# Patient Record
Sex: FEMALE | Race: WHITE | URBAN - METROPOLITAN AREA
[De-identification: names, ages, dates, MRNs, and addresses within clinical notes are randomized per-mention and may not be internally consistent; named-entity substitution may affect disease eponyms.]

---

## 2017-02-21 ENCOUNTER — OUTPATIENT (OUTPATIENT)
Dept: OUTPATIENT SERVICES | Facility: HOSPITAL | Age: 72
LOS: 1 days | Discharge: HOME | End: 2017-02-21

## 2017-06-27 ENCOUNTER — OUTPATIENT (OUTPATIENT)
Dept: OUTPATIENT SERVICES | Facility: HOSPITAL | Age: 72
LOS: 1 days | Discharge: HOME | End: 2017-06-27

## 2017-06-27 DIAGNOSIS — D64.9 ANEMIA, UNSPECIFIED: ICD-10-CM

## 2017-06-27 DIAGNOSIS — R53.82 CHRONIC FATIGUE, UNSPECIFIED: ICD-10-CM

## 2017-06-27 DIAGNOSIS — E78.5 HYPERLIPIDEMIA, UNSPECIFIED: ICD-10-CM

## 2017-06-28 DIAGNOSIS — E03.9 HYPOTHYROIDISM, UNSPECIFIED: ICD-10-CM

## 2017-06-28 DIAGNOSIS — R53.82 CHRONIC FATIGUE, UNSPECIFIED: ICD-10-CM

## 2017-06-28 DIAGNOSIS — E11.9 TYPE 2 DIABETES MELLITUS WITHOUT COMPLICATIONS: ICD-10-CM

## 2017-06-28 DIAGNOSIS — D64.9 ANEMIA, UNSPECIFIED: ICD-10-CM

## 2017-06-28 DIAGNOSIS — E78.5 HYPERLIPIDEMIA, UNSPECIFIED: ICD-10-CM

## 2018-01-24 ENCOUNTER — OUTPATIENT (OUTPATIENT)
Dept: OUTPATIENT SERVICES | Facility: HOSPITAL | Age: 73
LOS: 1 days | Discharge: HOME | End: 2018-01-24

## 2018-01-24 DIAGNOSIS — D64.9 ANEMIA, UNSPECIFIED: ICD-10-CM

## 2018-01-24 DIAGNOSIS — R73.01 IMPAIRED FASTING GLUCOSE: ICD-10-CM

## 2018-01-24 DIAGNOSIS — R53.82 CHRONIC FATIGUE, UNSPECIFIED: ICD-10-CM

## 2018-01-24 DIAGNOSIS — E78.5 HYPERLIPIDEMIA, UNSPECIFIED: ICD-10-CM

## 2018-04-24 ENCOUNTER — OUTPATIENT (OUTPATIENT)
Dept: OUTPATIENT SERVICES | Facility: HOSPITAL | Age: 73
LOS: 1 days | Discharge: HOME | End: 2018-04-24

## 2018-04-24 DIAGNOSIS — E78.5 HYPERLIPIDEMIA, UNSPECIFIED: ICD-10-CM

## 2018-08-18 ENCOUNTER — TRANSCRIPTION ENCOUNTER (OUTPATIENT)
Age: 73
End: 2018-08-18

## 2018-09-12 ENCOUNTER — OUTPATIENT (OUTPATIENT)
Dept: OUTPATIENT SERVICES | Facility: HOSPITAL | Age: 73
LOS: 1 days | Discharge: HOME | End: 2018-09-12

## 2018-09-12 DIAGNOSIS — E78.5 HYPERLIPIDEMIA, UNSPECIFIED: ICD-10-CM

## 2018-10-17 ENCOUNTER — OUTPATIENT (OUTPATIENT)
Dept: OUTPATIENT SERVICES | Facility: HOSPITAL | Age: 73
LOS: 1 days | Discharge: HOME | End: 2018-10-17

## 2018-10-17 DIAGNOSIS — R76.12 NONSPECIFIC REACTION TO CELL MEDIATED IMMUNITY MEASUREMENT OF GAMMA INTERFERON ANTIGEN RESPONSE WITHOUT ACTIVE TUBERCULOSIS: ICD-10-CM

## 2019-02-01 ENCOUNTER — OUTPATIENT (OUTPATIENT)
Dept: OUTPATIENT SERVICES | Facility: HOSPITAL | Age: 74
LOS: 1 days | Discharge: HOME | End: 2019-02-01

## 2019-02-01 DIAGNOSIS — Z79.899 OTHER LONG TERM (CURRENT) DRUG THERAPY: ICD-10-CM

## 2019-02-01 DIAGNOSIS — E11.9 TYPE 2 DIABETES MELLITUS WITHOUT COMPLICATIONS: ICD-10-CM

## 2019-02-01 DIAGNOSIS — R53.82 CHRONIC FATIGUE, UNSPECIFIED: ICD-10-CM

## 2019-02-01 DIAGNOSIS — D64.9 ANEMIA, UNSPECIFIED: ICD-10-CM

## 2019-02-01 DIAGNOSIS — E78.5 HYPERLIPIDEMIA, UNSPECIFIED: ICD-10-CM

## 2019-02-01 DIAGNOSIS — D51.9 VITAMIN B12 DEFICIENCY ANEMIA, UNSPECIFIED: ICD-10-CM

## 2019-02-01 DIAGNOSIS — E03.9 HYPOTHYROIDISM, UNSPECIFIED: ICD-10-CM

## 2019-08-30 ENCOUNTER — OUTPATIENT (OUTPATIENT)
Dept: OUTPATIENT SERVICES | Facility: HOSPITAL | Age: 74
LOS: 1 days | Discharge: HOME | End: 2019-08-30

## 2019-08-30 DIAGNOSIS — E78.5 HYPERLIPIDEMIA, UNSPECIFIED: ICD-10-CM

## 2019-09-27 ENCOUNTER — OUTPATIENT (OUTPATIENT)
Dept: OUTPATIENT SERVICES | Facility: HOSPITAL | Age: 74
LOS: 1 days | Discharge: HOME | End: 2019-09-27

## 2019-09-27 DIAGNOSIS — R76.12 NONSPECIFIC REACTION TO CELL MEDIATED IMMUNITY MEASUREMENT OF GAMMA INTERFERON ANTIGEN RESPONSE WITHOUT ACTIVE TUBERCULOSIS: ICD-10-CM

## 2020-07-21 ENCOUNTER — TRANSCRIPTION ENCOUNTER (OUTPATIENT)
Age: 75
End: 2020-07-21

## 2022-04-25 ENCOUNTER — TRANSCRIPTION ENCOUNTER (OUTPATIENT)
Age: 77
End: 2022-04-25

## 2022-05-16 ENCOUNTER — OUTPATIENT (OUTPATIENT)
Dept: OUTPATIENT SERVICES | Facility: HOSPITAL | Age: 77
LOS: 1 days | Discharge: HOME | End: 2022-05-16
Payer: MEDICARE

## 2022-05-16 VITALS
WEIGHT: 130.07 LBS | HEART RATE: 80 BPM | SYSTOLIC BLOOD PRESSURE: 180 MMHG | DIASTOLIC BLOOD PRESSURE: 78 MMHG | HEIGHT: 60 IN | RESPIRATION RATE: 18 BRPM | OXYGEN SATURATION: 98 % | TEMPERATURE: 97 F

## 2022-05-16 DIAGNOSIS — Z98.890 OTHER SPECIFIED POSTPROCEDURAL STATES: Chronic | ICD-10-CM

## 2022-05-16 DIAGNOSIS — Z01.818 ENCOUNTER FOR OTHER PREPROCEDURAL EXAMINATION: ICD-10-CM

## 2022-05-16 DIAGNOSIS — N20.0 CALCULUS OF KIDNEY: ICD-10-CM

## 2022-05-16 DIAGNOSIS — N20.0 CALCULUS OF KIDNEY: Chronic | ICD-10-CM

## 2022-05-16 LAB
ALBUMIN SERPL ELPH-MCNC: 4.6 G/DL — SIGNIFICANT CHANGE UP (ref 3.5–5.2)
ALP SERPL-CCNC: 87 U/L — SIGNIFICANT CHANGE UP (ref 30–115)
ALT FLD-CCNC: 21 U/L — SIGNIFICANT CHANGE UP (ref 0–41)
ANION GAP SERPL CALC-SCNC: 13 MMOL/L — SIGNIFICANT CHANGE UP (ref 7–14)
APPEARANCE UR: CLEAR — SIGNIFICANT CHANGE UP
APTT BLD: 29.7 SEC — SIGNIFICANT CHANGE UP (ref 27–39.2)
AST SERPL-CCNC: 22 U/L — SIGNIFICANT CHANGE UP (ref 0–41)
BASOPHILS # BLD AUTO: 0.03 K/UL — SIGNIFICANT CHANGE UP (ref 0–0.2)
BASOPHILS NFR BLD AUTO: 0.4 % — SIGNIFICANT CHANGE UP (ref 0–1)
BILIRUB SERPL-MCNC: 0.3 MG/DL — SIGNIFICANT CHANGE UP (ref 0.2–1.2)
BILIRUB UR-MCNC: NEGATIVE — SIGNIFICANT CHANGE UP
BUN SERPL-MCNC: 10 MG/DL — SIGNIFICANT CHANGE UP (ref 10–20)
CALCIUM SERPL-MCNC: 9.7 MG/DL — SIGNIFICANT CHANGE UP (ref 8.5–10.1)
CHLORIDE SERPL-SCNC: 107 MMOL/L — SIGNIFICANT CHANGE UP (ref 98–110)
CO2 SERPL-SCNC: 23 MMOL/L — SIGNIFICANT CHANGE UP (ref 17–32)
COLOR SPEC: COLORLESS — SIGNIFICANT CHANGE UP
CREAT SERPL-MCNC: 0.9 MG/DL — SIGNIFICANT CHANGE UP (ref 0.7–1.5)
DIFF PNL FLD: NEGATIVE — SIGNIFICANT CHANGE UP
EGFR: 67 ML/MIN/1.73M2 — SIGNIFICANT CHANGE UP
EOSINOPHIL # BLD AUTO: 0.07 K/UL — SIGNIFICANT CHANGE UP (ref 0–0.7)
EOSINOPHIL NFR BLD AUTO: 1 % — SIGNIFICANT CHANGE UP (ref 0–8)
GLUCOSE SERPL-MCNC: 96 MG/DL — SIGNIFICANT CHANGE UP (ref 70–99)
GLUCOSE UR QL: NEGATIVE — SIGNIFICANT CHANGE UP
HCT VFR BLD CALC: 41.1 % — SIGNIFICANT CHANGE UP (ref 37–47)
HGB BLD-MCNC: 13.4 G/DL — SIGNIFICANT CHANGE UP (ref 12–16)
IMM GRANULOCYTES NFR BLD AUTO: 0.3 % — SIGNIFICANT CHANGE UP (ref 0.1–0.3)
INR BLD: 1 RATIO — SIGNIFICANT CHANGE UP (ref 0.65–1.3)
KETONES UR-MCNC: NEGATIVE — SIGNIFICANT CHANGE UP
LEUKOCYTE ESTERASE UR-ACNC: NEGATIVE — SIGNIFICANT CHANGE UP
LYMPHOCYTES # BLD AUTO: 1.54 K/UL — SIGNIFICANT CHANGE UP (ref 1.2–3.4)
LYMPHOCYTES # BLD AUTO: 22.7 % — SIGNIFICANT CHANGE UP (ref 20.5–51.1)
MCHC RBC-ENTMCNC: 29.4 PG — SIGNIFICANT CHANGE UP (ref 27–31)
MCHC RBC-ENTMCNC: 32.6 G/DL — SIGNIFICANT CHANGE UP (ref 32–37)
MCV RBC AUTO: 90.1 FL — SIGNIFICANT CHANGE UP (ref 81–99)
MONOCYTES # BLD AUTO: 0.62 K/UL — HIGH (ref 0.1–0.6)
MONOCYTES NFR BLD AUTO: 9.2 % — SIGNIFICANT CHANGE UP (ref 1.7–9.3)
NEUTROPHILS # BLD AUTO: 4.49 K/UL — SIGNIFICANT CHANGE UP (ref 1.4–6.5)
NEUTROPHILS NFR BLD AUTO: 66.4 % — SIGNIFICANT CHANGE UP (ref 42.2–75.2)
NITRITE UR-MCNC: NEGATIVE — SIGNIFICANT CHANGE UP
NRBC # BLD: 0 /100 WBCS — SIGNIFICANT CHANGE UP (ref 0–0)
PH UR: 7 — SIGNIFICANT CHANGE UP (ref 5–8)
PLATELET # BLD AUTO: 228 K/UL — SIGNIFICANT CHANGE UP (ref 130–400)
POTASSIUM SERPL-MCNC: 3.8 MMOL/L — SIGNIFICANT CHANGE UP (ref 3.5–5)
POTASSIUM SERPL-SCNC: 3.8 MMOL/L — SIGNIFICANT CHANGE UP (ref 3.5–5)
PROT SERPL-MCNC: 7.2 G/DL — SIGNIFICANT CHANGE UP (ref 6–8)
PROT UR-MCNC: NEGATIVE — SIGNIFICANT CHANGE UP
PROTHROM AB SERPL-ACNC: 11.5 SEC — SIGNIFICANT CHANGE UP (ref 9.95–12.87)
RBC # BLD: 4.56 M/UL — SIGNIFICANT CHANGE UP (ref 4.2–5.4)
RBC # FLD: 13 % — SIGNIFICANT CHANGE UP (ref 11.5–14.5)
SODIUM SERPL-SCNC: 143 MMOL/L — SIGNIFICANT CHANGE UP (ref 135–146)
SP GR SPEC: 1 — LOW (ref 1.01–1.03)
UROBILINOGEN FLD QL: SIGNIFICANT CHANGE UP
WBC # BLD: 6.77 K/UL — SIGNIFICANT CHANGE UP (ref 4.8–10.8)
WBC # FLD AUTO: 6.77 K/UL — SIGNIFICANT CHANGE UP (ref 4.8–10.8)

## 2022-05-16 PROCEDURE — 93010 ELECTROCARDIOGRAM REPORT: CPT

## 2022-05-16 PROCEDURE — 71046 X-RAY EXAM CHEST 2 VIEWS: CPT | Mod: 26

## 2022-05-16 NOTE — H&P PST ADULT - ATTENDING COMMENTS
Attestation to H&P May 23, 2022  Denies any changes.  Has not been on any type of anticoagulation, vitamins, supplements, NSAIDs.  Has no questions about the procedure today and understands all potential outcomes as well as necessary adjuvant staged elective or urgent procedures depending on success of ESWL.  We reviewed all postoperative expectations, discharge and follow-up instructions, medications, side effects and restrictions and all questions were answered.    David Flores MD Attestation to H&P May 25, 2022 1:38pm  Denies any changes.  Has not been on any type of anticoagulation, vitamins, supplements, NSAIDs.  Has no questions about the procedure today and understands all potential outcomes as well as necessary adjuvant staged elective or urgent procedures depending on success of ESWL.  We reviewed all postoperative expectations, discharge and follow-up instructions, medications, side effects and restrictions and all questions were answered.    David Flores MD

## 2022-05-16 NOTE — H&P PST ADULT - REASON FOR ADMISSION
Patient is a  77 year old  female presenting to PAST in preparation for   RIGHT EXTRACORPOREAL SHOCK WAVE LITHOTRIPSY on   5/23/22 under lsb anesthesia by Dr. purvis

## 2022-05-16 NOTE — H&P PST ADULT - HISTORY OF PRESENT ILLNESS
pt with renal calculi x 3 yrs  no pain till 3 weeks ago- passing stones  stuck in urethra  now for planned procedure     PATIENT CURRENTLY DENIES CHEST PAIN  SHORTNESS OF BREATH  PALPITATIONS,  DYSURIA, OR UPPER RESPIRATORY INFECTION IN PAST 2 WEEKS  EXERCISE  TOLERANCE  1-2 FLIGHT OF STAIRS  WITHOUT SHORTNESS OF BREATH  denies travel outside the [place] in the past 30 days  Patient denies any signs or symptoms of COVID 19 and denies contact with known positive individuals.  They have an appointment for repeat COVID testing pre-procedure and acknowledge its time and place.  They were instructed to quarantine pre-procedure, practice exposure control measures, continue to self-monitor and report any concerns to their proceduralist.  pt advised self quarantine till day of procedure  Anesthesia Alert  NO--Difficult Airway  NO--History of neck surgery or radiation  NO--Limited ROM of neck  NO--History of Malignant hyperthermia  NO--No personal or family history of Pseudocholinesterase deficiency.  NO--Prior Anesthesia Complication  NO--Latex Allergy  NO--Loose teeth  NO--History of Rheumatoid Arthritis  NO--Bleeding risk  NO--CIERRA  MTHFR mutation     PT DENIES ANY RASHES, ABRASION, OR OPEN WOUNDS OR CUTS    AS PER THE PT, THIS IS HIS/HER COMPLETE MEDICAL AND SURGICAL HX, INCLUDING MEDICATIONS PRESCRIBED AND OVER THE COUNTER    Patient verbalized understanding of instructions and was given the opportunity to ask questions and have them answered.    pt denies any suicidal ideation or thoughts, pt states not a threat to self or others    N20.0/82333    Calculus of kidney    Encounter for other preprocedural examination    ^N20.0/76451    Calculus of kidney    Encounter for other preprocedural examination

## 2022-05-16 NOTE — H&P PST ADULT - NSICDXPASTMEDICALHX_GEN_ALL_CORE_FT
PAST MEDICAL HISTORY:  HTN (hypertension)     Lung bullae     MTHFR mutation     Renal calculi     TIA (transient ischemic attack) 2004

## 2022-05-17 LAB
CULTURE RESULTS: NO GROWTH — SIGNIFICANT CHANGE UP
SPECIMEN SOURCE: SIGNIFICANT CHANGE UP

## 2022-05-18 ENCOUNTER — TRANSCRIPTION ENCOUNTER (OUTPATIENT)
Age: 77
End: 2022-05-18

## 2022-05-25 ENCOUNTER — OUTPATIENT (OUTPATIENT)
Dept: OUTPATIENT SERVICES | Facility: HOSPITAL | Age: 77
LOS: 1 days | Discharge: HOME | End: 2022-05-25
Payer: MEDICARE

## 2022-05-25 ENCOUNTER — TRANSCRIPTION ENCOUNTER (OUTPATIENT)
Age: 77
End: 2022-05-25

## 2022-05-25 VITALS
HEIGHT: 60 IN | OXYGEN SATURATION: 100 % | DIASTOLIC BLOOD PRESSURE: 83 MMHG | SYSTOLIC BLOOD PRESSURE: 192 MMHG | TEMPERATURE: 97 F | WEIGHT: 130.07 LBS | HEART RATE: 83 BPM | RESPIRATION RATE: 17 BRPM

## 2022-05-25 VITALS — DIASTOLIC BLOOD PRESSURE: 70 MMHG | HEART RATE: 69 BPM | SYSTOLIC BLOOD PRESSURE: 152 MMHG | RESPIRATION RATE: 17 BRPM

## 2022-05-25 DIAGNOSIS — N20.0 CALCULUS OF KIDNEY: Chronic | ICD-10-CM

## 2022-05-25 DIAGNOSIS — Z98.890 OTHER SPECIFIED POSTPROCEDURAL STATES: Chronic | ICD-10-CM

## 2022-05-25 PROBLEM — G45.9 TRANSIENT CEREBRAL ISCHEMIC ATTACK, UNSPECIFIED: Chronic | Status: ACTIVE | Noted: 2022-05-16

## 2022-05-25 PROBLEM — Z15.89 GENETIC SUSCEPTIBILITY TO OTHER DISEASE: Chronic | Status: ACTIVE | Noted: 2022-05-16

## 2022-05-25 PROBLEM — J43.9 EMPHYSEMA, UNSPECIFIED: Chronic | Status: ACTIVE | Noted: 2022-05-16

## 2022-05-25 PROBLEM — I10 ESSENTIAL (PRIMARY) HYPERTENSION: Chronic | Status: ACTIVE | Noted: 2022-05-16

## 2022-05-25 PROCEDURE — 74018 RADEX ABDOMEN 1 VIEW: CPT | Mod: 26

## 2022-05-25 RX ORDER — ONDANSETRON 8 MG/1
4 TABLET, FILM COATED ORAL ONCE
Refills: 0 | Status: DISCONTINUED | OUTPATIENT
Start: 2022-05-25 | End: 2022-05-26

## 2022-05-25 RX ORDER — OXYCODONE HYDROCHLORIDE 5 MG/1
5 TABLET ORAL ONCE
Refills: 0 | Status: DISCONTINUED | OUTPATIENT
Start: 2022-05-25 | End: 2022-05-26

## 2022-05-25 RX ORDER — LOSARTAN POTASSIUM 100 MG/1
1 TABLET, FILM COATED ORAL
Qty: 0 | Refills: 0 | DISCHARGE

## 2022-05-25 RX ORDER — SODIUM CHLORIDE 9 MG/ML
1000 INJECTION, SOLUTION INTRAVENOUS
Refills: 0 | Status: DISCONTINUED | OUTPATIENT
Start: 2022-05-25 | End: 2022-05-26

## 2022-05-25 RX ORDER — ALFUZOSIN HYDROCHLORIDE 10 MG/1
1 TABLET, EXTENDED RELEASE ORAL
Qty: 0 | Refills: 0 | DISCHARGE

## 2022-05-25 RX ORDER — HYDROMORPHONE HYDROCHLORIDE 2 MG/ML
1 INJECTION INTRAMUSCULAR; INTRAVENOUS; SUBCUTANEOUS ONCE
Refills: 0 | Status: DISCONTINUED | OUTPATIENT
Start: 2022-05-25 | End: 2022-05-26

## 2022-05-25 RX ORDER — HYDROMORPHONE HYDROCHLORIDE 2 MG/ML
0.5 INJECTION INTRAMUSCULAR; INTRAVENOUS; SUBCUTANEOUS ONCE
Refills: 0 | Status: DISCONTINUED | OUTPATIENT
Start: 2022-05-25 | End: 2022-05-26

## 2022-05-25 RX ADMIN — SODIUM CHLORIDE 150 MILLILITER(S): 9 INJECTION, SOLUTION INTRAVENOUS at 15:08

## 2022-05-25 NOTE — ASU DISCHARGE PLAN (ADULT/PEDIATRIC) - NS MD DC FALL RISK RISK
For information on Fall & Injury Prevention, visit: [email or web address] OR  [email or web address] OR  https://www.cdc.gov/steadi/patient.html

## 2022-05-25 NOTE — ASU PREOP CHECKLIST - WEIGHT IN LBS
130 Chart reviewed. Patient seen and examined with Dr. Dotson. Patient is known to me. I agree with Dr. Dotson's history, MSE, A/P with below additions. Patient adamantly denies SI, remains future oriented and discusses sobriety. No current AH/VH. Affect is rather euthymic, perhaps a touch anxious. Thought process is linear. She has bilateral tremor.  May have made comments in setting of intoxication- however adamantly denies SI now.  Plan per above. Medical team will treat alcohol withdrawal. She does not warrant a 1:1 for SI as she is not suicidal. Call with questions.

## 2022-05-25 NOTE — ASU PATIENT PROFILE, ADULT - FALL HARM RISK - HARM RISK INTERVENTIONS

## 2022-05-25 NOTE — CHART NOTE - NSCHARTNOTEFT_GEN_A_CORE
PACU ANESTHESIA ADMISSION NOTE      Procedure: Right ESWL  Post op diagnosis:  Right kidney stone    ____  Intubated  TV:______       Rate: ______      FiO2: ______    __x__  Patent Airway    __x__  Full return of protective reflexes    __x__  Full recovery from anesthesia / back to baseline status    Vitals:  T(C): 36.1 (05-25-22 @ 14:06), Max: 36.1 (05-25-22 @ 13:15)  HR: 83 (05-25-22 @ 14:06) (83 - 83)  BP: 192/83 (05-25-22 @ 14:06) (192/83 - 192/83)  RR: 17 (05-25-22 @ 14:06) (17 - 17)  SpO2: 100% (05-25-22 @ 14:06) (100% - 100%)    Mental Status:  __x__ Awake   ___x__ Alert   _____ Drowsy   _____ Sedated    Nausea/Vomiting:  ____ NO  ___x__Yes,   See Post - Op Orders          Pain Scale (0-10):  _____    Treatment: ____ None    _x___ See Post - Op/PCA Orders    Post - Operative Fluids:   ____ Oral   ___x_ See Post - Op Orders    Plan: Discharge:   _x___Home       _____Floor     _____Critical Care    _____  Other:_________________    Comments: uneventful anesthesia course no complications. VItals stable. Pt transferred to PACU

## 2022-06-01 ENCOUNTER — OUTPATIENT (OUTPATIENT)
Dept: OUTPATIENT SERVICES | Facility: HOSPITAL | Age: 77
LOS: 1 days | Discharge: HOME | End: 2022-06-01
Payer: MEDICARE

## 2022-06-01 DIAGNOSIS — R91.8 OTHER NONSPECIFIC ABNORMAL FINDING OF LUNG FIELD: ICD-10-CM

## 2022-06-01 DIAGNOSIS — Z98.890 OTHER SPECIFIED POSTPROCEDURAL STATES: Chronic | ICD-10-CM

## 2022-06-01 DIAGNOSIS — N20.0 CALCULUS OF KIDNEY: Chronic | ICD-10-CM

## 2022-06-01 PROCEDURE — 71250 CT THORAX DX C-: CPT | Mod: 26,MH

## 2022-06-03 DIAGNOSIS — N20.1 CALCULUS OF URETER: ICD-10-CM

## 2022-06-03 DIAGNOSIS — I10 ESSENTIAL (PRIMARY) HYPERTENSION: ICD-10-CM

## 2022-06-03 DIAGNOSIS — Z86.73 PERSONAL HISTORY OF TRANSIENT ISCHEMIC ATTACK (TIA), AND CEREBRAL INFARCTION WITHOUT RESIDUAL DEFICITS: ICD-10-CM

## 2022-06-03 DIAGNOSIS — Z88.1 ALLERGY STATUS TO OTHER ANTIBIOTIC AGENTS STATUS: ICD-10-CM

## 2022-08-30 ENCOUNTER — APPOINTMENT (OUTPATIENT)
Age: 77
End: 2022-08-30

## 2022-08-30 VITALS
OXYGEN SATURATION: 98 % | HEART RATE: 103 BPM | SYSTOLIC BLOOD PRESSURE: 130 MMHG | RESPIRATION RATE: 14 BRPM | DIASTOLIC BLOOD PRESSURE: 70 MMHG | BODY MASS INDEX: 25.13 KG/M2 | HEIGHT: 60 IN | WEIGHT: 128 LBS

## 2022-08-30 DIAGNOSIS — Z87.442 PERSONAL HISTORY OF URINARY CALCULI: ICD-10-CM

## 2022-08-30 DIAGNOSIS — Z86.79 PERSONAL HISTORY OF OTHER DISEASES OF THE CIRCULATORY SYSTEM: ICD-10-CM

## 2022-08-30 PROCEDURE — 99203 OFFICE O/P NEW LOW 30 MIN: CPT

## 2022-08-30 NOTE — DISCUSSION/SUMMARY
[FreeTextEntry1] : LUNG NODULE, NON SMOKER, NO OLD CT CHEST/ LOW RISK\par LTBI\par REPEAT CT CHEST\par SPOKE WITH PATIENT/  AT BEDSIDE

## 2022-08-30 NOTE — HISTORY OF PRESENT ILLNESS
[TextBox_4] : 77 YEARS OLD PRESENTED FOR ABOVE, HAD CXR PREOP KIRILL OPACITY, UNDERWENT CT CHEST RUL/ LLL NODULE REFERRED, NON SMOKER, SAW REGINA BEFORE FOR LTBI TOOK INH FOR 3 MONTH THAN DC HIGH LFT, DENIES SOB, COUGH, WEIGHT LOSS, NO PRIOR CT CHEST

## 2022-09-19 ENCOUNTER — TRANSCRIPTION ENCOUNTER (OUTPATIENT)
Age: 77
End: 2022-09-19

## 2022-09-19 ENCOUNTER — OUTPATIENT (OUTPATIENT)
Dept: OUTPATIENT SERVICES | Facility: HOSPITAL | Age: 77
LOS: 1 days | Discharge: HOME | End: 2022-09-19

## 2022-09-19 ENCOUNTER — RESULT REVIEW (OUTPATIENT)
Age: 77
End: 2022-09-19

## 2022-09-19 DIAGNOSIS — N20.0 CALCULUS OF KIDNEY: Chronic | ICD-10-CM

## 2022-09-19 DIAGNOSIS — R91.1 SOLITARY PULMONARY NODULE: ICD-10-CM

## 2022-09-19 DIAGNOSIS — Z98.890 OTHER SPECIFIED POSTPROCEDURAL STATES: Chronic | ICD-10-CM

## 2022-09-19 PROCEDURE — 71250 CT THORAX DX C-: CPT | Mod: 26

## 2023-01-21 ENCOUNTER — NON-APPOINTMENT (OUTPATIENT)
Age: 78
End: 2023-01-21

## 2023-03-01 ENCOUNTER — APPOINTMENT (OUTPATIENT)
Dept: PULMONOLOGY | Facility: CLINIC | Age: 78
End: 2023-03-01
Payer: MEDICARE

## 2023-03-01 VITALS
HEART RATE: 99 BPM | SYSTOLIC BLOOD PRESSURE: 136 MMHG | BODY MASS INDEX: 25.13 KG/M2 | OXYGEN SATURATION: 97 % | RESPIRATION RATE: 14 BRPM | DIASTOLIC BLOOD PRESSURE: 80 MMHG | HEIGHT: 60 IN | WEIGHT: 128 LBS

## 2023-03-01 PROCEDURE — 99213 OFFICE O/P EST LOW 20 MIN: CPT

## 2023-03-01 NOTE — DISCUSSION/SUMMARY
[FreeTextEntry1] : LUNG NODULE, NON SMOKER, CHEST CT NOTE\par BORN IN [place]\par COUGH/ PND TO SEE ENT\par LTBI\par REPEAT CT CHEST\par SPOKE WITH PATIENT/  AT BEDSIDE

## 2023-03-25 ENCOUNTER — RESULT REVIEW (OUTPATIENT)
Age: 78
End: 2023-03-25

## 2023-03-25 ENCOUNTER — OUTPATIENT (OUTPATIENT)
Dept: OUTPATIENT SERVICES | Facility: HOSPITAL | Age: 78
LOS: 1 days | End: 2023-03-25
Payer: MEDICARE

## 2023-03-25 DIAGNOSIS — N20.0 CALCULUS OF KIDNEY: Chronic | ICD-10-CM

## 2023-03-25 DIAGNOSIS — Z00.8 ENCOUNTER FOR OTHER GENERAL EXAMINATION: ICD-10-CM

## 2023-03-25 DIAGNOSIS — Z98.890 OTHER SPECIFIED POSTPROCEDURAL STATES: Chronic | ICD-10-CM

## 2023-03-25 DIAGNOSIS — R91.1 SOLITARY PULMONARY NODULE: ICD-10-CM

## 2023-03-25 PROCEDURE — 71250 CT THORAX DX C-: CPT

## 2023-03-25 PROCEDURE — 71250 CT THORAX DX C-: CPT | Mod: 26

## 2023-03-26 DIAGNOSIS — R91.1 SOLITARY PULMONARY NODULE: ICD-10-CM

## 2023-09-14 ENCOUNTER — APPOINTMENT (OUTPATIENT)
Dept: PULMONOLOGY | Facility: CLINIC | Age: 78
End: 2023-09-14
Payer: MEDICARE

## 2023-09-14 VITALS
HEIGHT: 60 IN | WEIGHT: 121 LBS | DIASTOLIC BLOOD PRESSURE: 70 MMHG | HEART RATE: 91 BPM | SYSTOLIC BLOOD PRESSURE: 136 MMHG | OXYGEN SATURATION: 98 % | BODY MASS INDEX: 23.75 KG/M2

## 2023-09-14 PROCEDURE — 99213 OFFICE O/P EST LOW 20 MIN: CPT

## 2024-03-19 ENCOUNTER — OUTPATIENT (OUTPATIENT)
Dept: OUTPATIENT SERVICES | Facility: HOSPITAL | Age: 79
LOS: 1 days | End: 2024-03-19
Payer: MEDICARE

## 2024-03-19 ENCOUNTER — RESULT REVIEW (OUTPATIENT)
Age: 79
End: 2024-03-19

## 2024-03-19 DIAGNOSIS — N20.0 CALCULUS OF KIDNEY: Chronic | ICD-10-CM

## 2024-03-19 DIAGNOSIS — Z00.8 ENCOUNTER FOR OTHER GENERAL EXAMINATION: ICD-10-CM

## 2024-03-19 DIAGNOSIS — R91.1 SOLITARY PULMONARY NODULE: ICD-10-CM

## 2024-03-19 DIAGNOSIS — Z98.890 OTHER SPECIFIED POSTPROCEDURAL STATES: Chronic | ICD-10-CM

## 2024-03-19 PROCEDURE — 71250 CT THORAX DX C-: CPT

## 2024-03-19 PROCEDURE — 71250 CT THORAX DX C-: CPT | Mod: 26

## 2024-03-20 DIAGNOSIS — R91.1 SOLITARY PULMONARY NODULE: ICD-10-CM

## 2024-03-22 ENCOUNTER — APPOINTMENT (OUTPATIENT)
Dept: PULMONOLOGY | Facility: CLINIC | Age: 79
End: 2024-03-22
Payer: MEDICARE

## 2024-03-22 VITALS
SYSTOLIC BLOOD PRESSURE: 130 MMHG | RESPIRATION RATE: 14 BRPM | OXYGEN SATURATION: 97 % | DIASTOLIC BLOOD PRESSURE: 76 MMHG | WEIGHT: 125 LBS | HEART RATE: 85 BPM | HEIGHT: 60 IN | BODY MASS INDEX: 24.54 KG/M2

## 2024-03-22 PROCEDURE — G2211 COMPLEX E/M VISIT ADD ON: CPT

## 2024-03-22 PROCEDURE — 99214 OFFICE O/P EST MOD 30 MIN: CPT

## 2024-03-22 NOTE — DISCUSSION/SUMMARY
[FreeTextEntry1] : LUNG NODULE, NON SMOKER, PET SCAN ABX/ STEROIDS BORN IN [place] COUGH/ PND RENEW NASAL SPRAYS LTBI SPOKE WITH PATIENT/  AT BEDSIDE

## 2024-03-22 NOTE — REASON FOR VISIT
[Follow-Up] : a follow-up visit [Cough] : cough [Abnormal CXR/ Chest CT] : an abnormal CXR/ chest CT [Pulmonary Nodules] : pulmonary nodules

## 2024-04-15 ENCOUNTER — RESULT REVIEW (OUTPATIENT)
Age: 79
End: 2024-04-15

## 2024-04-15 ENCOUNTER — OUTPATIENT (OUTPATIENT)
Dept: OUTPATIENT SERVICES | Facility: HOSPITAL | Age: 79
LOS: 1 days | End: 2024-04-15
Payer: MEDICARE

## 2024-04-15 DIAGNOSIS — Z98.890 OTHER SPECIFIED POSTPROCEDURAL STATES: Chronic | ICD-10-CM

## 2024-04-15 DIAGNOSIS — R91.8 OTHER NONSPECIFIC ABNORMAL FINDING OF LUNG FIELD: ICD-10-CM

## 2024-04-15 DIAGNOSIS — Z00.8 ENCOUNTER FOR OTHER GENERAL EXAMINATION: ICD-10-CM

## 2024-04-15 DIAGNOSIS — N20.0 CALCULUS OF KIDNEY: Chronic | ICD-10-CM

## 2024-04-15 LAB — GLUCOSE BLDC GLUCOMTR-MCNC: 88 MG/DL — SIGNIFICANT CHANGE UP (ref 70–99)

## 2024-04-15 PROCEDURE — 82962 GLUCOSE BLOOD TEST: CPT

## 2024-04-15 PROCEDURE — 78815 PET IMAGE W/CT SKULL-THIGH: CPT | Mod: 26,PI

## 2024-04-15 PROCEDURE — 78815 PET IMAGE W/CT SKULL-THIGH: CPT | Mod: PI

## 2024-04-15 PROCEDURE — A9552: CPT

## 2024-04-16 DIAGNOSIS — R91.8 OTHER NONSPECIFIC ABNORMAL FINDING OF LUNG FIELD: ICD-10-CM

## 2024-04-26 ENCOUNTER — APPOINTMENT (OUTPATIENT)
Dept: PULMONOLOGY | Facility: CLINIC | Age: 79
End: 2024-04-26
Payer: MEDICARE

## 2024-04-26 VITALS
HEART RATE: 97 BPM | OXYGEN SATURATION: 99 % | SYSTOLIC BLOOD PRESSURE: 130 MMHG | DIASTOLIC BLOOD PRESSURE: 76 MMHG | HEIGHT: 60 IN | BODY MASS INDEX: 23.75 KG/M2 | WEIGHT: 121 LBS

## 2024-04-26 DIAGNOSIS — R09.82 POSTNASAL DRIP: ICD-10-CM

## 2024-04-26 DIAGNOSIS — R05.9 COUGH, UNSPECIFIED: ICD-10-CM

## 2024-04-26 PROCEDURE — G2211 COMPLEX E/M VISIT ADD ON: CPT

## 2024-04-26 PROCEDURE — 99213 OFFICE O/P EST LOW 20 MIN: CPT

## 2024-04-26 NOTE — HISTORY OF PRESENT ILLNESS
[TextBox_4] : COUGH/ PND SP COVID  NON SMOKER CHEST CT  NOTED NEW 1.6 CM LUNG NODULE/ PET SCAN NOTED

## 2024-04-26 NOTE — DISCUSSION/SUMMARY
[FreeTextEntry1] : LUNG NODULE, NON SMOKER, PET SCAN NOTED, PLAN BX ABX/ STEROIDS BORN IN [place] COUGH/ PND RENEW NASAL SPRAYS

## 2024-04-30 ENCOUNTER — APPOINTMENT (OUTPATIENT)
Dept: CARDIOTHORACIC SURGERY | Facility: CLINIC | Age: 79
End: 2024-04-30
Payer: MEDICARE

## 2024-04-30 VITALS
WEIGHT: 121 LBS | RESPIRATION RATE: 13 BRPM | HEIGHT: 60 IN | OXYGEN SATURATION: 96 % | DIASTOLIC BLOOD PRESSURE: 82 MMHG | HEART RATE: 83 BPM | TEMPERATURE: 98.4 F | BODY MASS INDEX: 23.75 KG/M2 | SYSTOLIC BLOOD PRESSURE: 148 MMHG

## 2024-04-30 DIAGNOSIS — Z00.00 ENCOUNTER FOR GENERAL ADULT MEDICAL EXAMINATION W/OUT ABNORMAL FINDINGS: ICD-10-CM

## 2024-04-30 PROCEDURE — 99204 OFFICE O/P NEW MOD 45 MIN: CPT

## 2024-04-30 RX ORDER — AMOXICILLIN 500 MG/1
500 CAPSULE ORAL
Refills: 0 | Status: ACTIVE | COMMUNITY

## 2024-04-30 RX ORDER — AMLODIPINE BESYLATE 5 MG/1
5 TABLET ORAL
Refills: 0 | Status: ACTIVE | COMMUNITY

## 2024-04-30 RX ORDER — LOSARTAN POTASSIUM 100 MG/1
100 TABLET, FILM COATED ORAL
Refills: 0 | Status: ACTIVE | COMMUNITY

## 2024-04-30 RX ORDER — AZITHROMYCIN 250 MG/1
250 TABLET, FILM COATED ORAL DAILY
Qty: 1 | Refills: 0 | Status: DISCONTINUED | COMMUNITY
Start: 2024-03-22 | End: 2024-04-30

## 2024-04-30 RX ORDER — PREDNISONE 20 MG/1
20 TABLET ORAL
Qty: 15 | Refills: 0 | Status: DISCONTINUED | COMMUNITY
Start: 2024-03-22 | End: 2024-04-30

## 2024-04-30 RX ORDER — ROSUVASTATIN CALCIUM 5 MG/1
5 TABLET, FILM COATED ORAL
Refills: 0 | Status: ACTIVE | COMMUNITY

## 2024-04-30 NOTE — PHYSICAL EXAM
[General Appearance - Alert] : alert [General Appearance - In No Acute Distress] : in no acute distress [Sclera] : the sclera and conjunctiva were normal [PERRL With Normal Accommodation] : pupils were equal in size, round, and reactive to light [Extraocular Movements] : extraocular movements were intact [Outer Ear] : the ears and nose were normal in appearance [Oropharynx] : the oropharynx was normal [Neck Appearance] : the appearance of the neck was normal [Neck Cervical Mass (___cm)] : no neck mass was observed [Jugular Venous Distention Increased] : there was no jugular-venous distention [Thyroid Diffuse Enlargement] : the thyroid was not enlarged [Thyroid Nodule] : there were no palpable thyroid nodules [] : no respiratory distress [Auscultation Breath Sounds / Voice Sounds] : lungs were clear to auscultation bilaterally [Heart Rate And Rhythm] : heart rate was normal and rhythm regular [Heart Sounds] : normal S1 and S2 [Heart Sounds Gallop] : no gallops [Murmurs] : no murmurs [Heart Sounds Pericardial Friction Rub] : no pericardial rub [Examination Of The Chest] : the chest was normal in appearance [Chest Visual Inspection Thoracic Asymmetry] : no chest asymmetry [Diminished Respiratory Excursion] : normal chest expansion

## 2024-05-01 ENCOUNTER — RESULT REVIEW (OUTPATIENT)
Age: 79
End: 2024-05-01

## 2024-05-01 LAB
ABO + RH PNL BLD: NORMAL
ALBUMIN SERPL ELPH-MCNC: 4.4 G/DL
ALP BLD-CCNC: 72 U/L
ALT SERPL-CCNC: 19 U/L
ANION GAP SERPL CALC-SCNC: 16 MMOL/L
AST SERPL-CCNC: 20 U/L
BASOPHILS # BLD AUTO: 0.03 K/UL
BASOPHILS NFR BLD AUTO: 0.4 %
BILIRUB SERPL-MCNC: 0.3 MG/DL
BUN SERPL-MCNC: 14 MG/DL
CALCIUM SERPL-MCNC: 9.8 MG/DL
CHLORIDE SERPL-SCNC: 104 MMOL/L
CO2 SERPL-SCNC: 23 MMOL/L
CREAT SERPL-MCNC: 0.9 MG/DL
EGFR: 66 ML/MIN/1.73M2
EOSINOPHIL # BLD AUTO: 0.03 K/UL
EOSINOPHIL NFR BLD AUTO: 0.4 %
GLUCOSE SERPL-MCNC: 171 MG/DL
HCT VFR BLD CALC: 42.3 %
HGB BLD-MCNC: 13.2 G/DL
IMM GRANULOCYTES NFR BLD AUTO: 0.5 %
INR PPP: 1.04 RATIO
LYMPHOCYTES # BLD AUTO: 1.31 K/UL
LYMPHOCYTES NFR BLD AUTO: 16 %
MAN DIFF?: NORMAL
MCHC RBC-ENTMCNC: 28.4 PG
MCHC RBC-ENTMCNC: 31.2 G/DL
MCV RBC AUTO: 91.2 FL
MONOCYTES # BLD AUTO: 0.45 K/UL
MONOCYTES NFR BLD AUTO: 5.5 %
NEUTROPHILS # BLD AUTO: 6.31 K/UL
NEUTROPHILS NFR BLD AUTO: 77.2 %
PLATELET # BLD AUTO: 263 K/UL
PMV BLD AUTO: 0 /100 WBCS
POTASSIUM SERPL-SCNC: 4.1 MMOL/L
PROT SERPL-MCNC: 6.6 G/DL
PT BLD: 11.9 SEC
RBC # BLD: 4.64 M/UL
RBC # FLD: 13.1 %
SODIUM SERPL-SCNC: 143 MMOL/L
WBC # FLD AUTO: 8.17 K/UL

## 2024-05-01 NOTE — HISTORY OF PRESENT ILLNESS
[FreeTextEntry1] : Ms. PADMINI LOPEZ is a 79 year F, never smoker, that arrives today for a consultation for a 1.6 CM FDG Avid  Lingular nodule. Also found to have FDG Avid Left Adrenal mass- no correlation on PET recommending short interval CT.  Patient was seen by their Pulmonologist, Dr. Hardy for annual follow up exam.   PMHX Bullous Lungs, HTN, HLD, Renal stones, current sinusitis on amoxicillin: Here today for surgical discussion.    CCS I ECOG 0, Independent Lives with   Denies major psychiatric history  Their Healthcare team is as follows:  PMD: Dr. Britt Cardiologist: Dr. Dahl Pulmonologist:  Dr. Hardy ENT: [facility]  
anxiety...
anxiety...

## 2024-05-01 NOTE — ASSESSMENT
[FreeTextEntry1] : Patient presents to office with .  Robotic Broch for Biopsy vs surgical resection discussed, including risks and benefits Images reviewed and discussed with patient Patient is favorable to biopsy  -Plan  #Lingular Nodule Robotic assisted  Bronchoscopy with FNA of Pulmonary Nodule 5/2/2024 Labs ordered IJuvenal saw, examined and reviewed the diagnostic images on patient:  PADMINI LOPEZ on 04/30/2024 and agreed with my Nurse Practitioner's clinical note, physical exam findings and treatment plan. IJuvenal saw, examined and reviewed the diagnostic images on patient:  PADMINI LOPEZ on 04/30/2024 and agreed with my Nurse Practitioner's clinical note, physical exam findings and treatment plan. Patient presented to the office in consultation with diagnosis of pulmonary nodule.  She is a never smoker with incidental finding of left upper lobe pulmonary nodule which is PET active suspicious for malignancy.  Patient also had scattered bullae throughout the left lung mostly in the left lower lobe.  But patient is a never smoker, very functional able to climb 3-4 flights of stairs.  I reviewed the CT and PET/CT images with the patient and her  and discussed diagnostic possibilities: Infection, inflammation, malignancy.  I recommended tissue sampling, robotic bronchoscopy and fine-needle aspiration was explained, surgical risk and possible complication as well as the possibility of false negative results were disclosed.  Patient understood and agreed to proceed.

## 2024-05-01 NOTE — ASU PATIENT PROFILE, ADULT - FALL HARM RISK - UNIVERSAL INTERVENTIONS
Bed in lowest position, wheels locked, appropriate side rails in place/Call bell, personal items and telephone in reach/Instruct patient to call for assistance before getting out of bed or chair/Non-slip footwear when patient is out of bed/[place] to call system/Physically safe environment - no spills, clutter or unnecessary equipment/Purposeful Proactive Rounding/Room/bathroom lighting operational, light cord in reach

## 2024-05-01 NOTE — DATA REVIEWED
[FreeTextEntry1] : ACC: [id number] EXAM: [facility]CT [facility] ONC FDG INIT ORDERED BY: MARILYN LING PROCEDURE DATE: 04/15/2024  INTERPRETATION: FDG PET CT STUDY initial treatment strategy REASON: TUMOR IMAGING - PET with concurrently acquired CT for attenuation correction and anatomic localization; skull base to mid - thigh .  CPT code 76618.  Fasting blood glucose level: 100 mg/dl  HISTORY: Interval development of a 1.6 cm lingular nodule on chest CT from 3/19/2024. PET/CT obtained for further evaluation.  TECHNIQUE: Approximately 45 minutes after the intravenous administration of 9.2 mCi 18-Fluorine FDG, whole body PET images were acquired from base of skull to mid - thigh.  CT protocol used for this PET/CT study is designed for attenuation correction and anatomic localization of PET findings. This  CT is not designed to replace state-of-the-art diagnostic CT scans for specific imaging protocols of different body parts.  COMPARISON : None. Correlation made with chest CT on 3/19/2024.  FINDINGS:  Head/Neck: FDG avid periapical lucency in the left maxilla. Consider dental consultation.  Normal uptake within the brain, pharyngeal lymphoid tissue, salivary glands and laryngeal musculature is noted.  Thorax: Biapical scarring. Left lung emphysematous/bullous changes.  FDG avid lingular nodule, SUV max 5.4.  Abdomen/Pelvis: Physiologic GI/  activity.  Focal FDG uptake, SUV max 5.1 seen in the left adrenal gland without a discernible mass on CT.  Liver SUV 3.4.  Musculoskeletal : No suspicious hypermetabolic osseous lesions.  Additional CT findings: Scattered hepatic cysts. Left upper pole renal cyst.  IMPRESSION:  FDG avid lingular nodule, SUV max 5.4-consistent with biologic tumor activity.  Focal FDG uptake, SUV max 5.1 seen in the left adrenal gland without a discernible mass on CT. Cannot exclude biologic tumor activity. Recommend a three-month follow-up CT abdomen, adrenal mass protocol to assess for any growth.  --- End of Report ---  DENISHA BEE MD; Attending Radiologist This document has been electronically signed. Apr 15 2024 11:00AM

## 2024-05-02 ENCOUNTER — RESULT REVIEW (OUTPATIENT)
Age: 79
End: 2024-05-02

## 2024-05-02 ENCOUNTER — TRANSCRIPTION ENCOUNTER (OUTPATIENT)
Age: 79
End: 2024-05-02

## 2024-05-02 ENCOUNTER — OUTPATIENT (OUTPATIENT)
Dept: OUTPATIENT SERVICES | Facility: HOSPITAL | Age: 79
LOS: 1 days | Discharge: HOME | End: 2024-05-02
Payer: MEDICARE

## 2024-05-02 ENCOUNTER — APPOINTMENT (OUTPATIENT)
Dept: CARDIOTHORACIC SURGERY | Facility: HOSPITAL | Age: 79
End: 2024-05-02

## 2024-05-02 VITALS
RESPIRATION RATE: 16 BRPM | HEART RATE: 88 BPM | TEMPERATURE: 98 F | SYSTOLIC BLOOD PRESSURE: 136 MMHG | OXYGEN SATURATION: 98 % | HEIGHT: 60 IN | WEIGHT: 120.59 LBS | DIASTOLIC BLOOD PRESSURE: 64 MMHG

## 2024-05-02 VITALS
HEART RATE: 83 BPM | SYSTOLIC BLOOD PRESSURE: 149 MMHG | OXYGEN SATURATION: 98 % | RESPIRATION RATE: 20 BRPM | DIASTOLIC BLOOD PRESSURE: 67 MMHG

## 2024-05-02 DIAGNOSIS — R91.1 SOLITARY PULMONARY NODULE: ICD-10-CM

## 2024-05-02 DIAGNOSIS — N20.0 CALCULUS OF KIDNEY: Chronic | ICD-10-CM

## 2024-05-02 DIAGNOSIS — Z98.890 OTHER SPECIFIED POSTPROCEDURAL STATES: Chronic | ICD-10-CM

## 2024-05-02 PROCEDURE — 88173 CYTOPATH EVAL FNA REPORT: CPT | Mod: 26

## 2024-05-02 PROCEDURE — 71250 CT THORAX DX C-: CPT

## 2024-05-02 PROCEDURE — 88305 TISSUE EXAM BY PATHOLOGIST: CPT

## 2024-05-02 PROCEDURE — 88305 TISSUE EXAM BY PATHOLOGIST: CPT | Mod: 26

## 2024-05-02 PROCEDURE — 71250 CT THORAX DX C-: CPT | Mod: 26

## 2024-05-02 PROCEDURE — 87015 SPECIMEN INFECT AGNT CONCNTJ: CPT

## 2024-05-02 PROCEDURE — S2900: CPT

## 2024-05-02 PROCEDURE — 71045 X-RAY EXAM CHEST 1 VIEW: CPT

## 2024-05-02 PROCEDURE — 88312 SPECIAL STAINS GROUP 1: CPT

## 2024-05-02 PROCEDURE — 31628 BRONCHOSCOPY/LUNG BX EACH: CPT

## 2024-05-02 PROCEDURE — 71045 X-RAY EXAM CHEST 1 VIEW: CPT | Mod: 26

## 2024-05-02 PROCEDURE — C9399: CPT

## 2024-05-02 PROCEDURE — 31624 DX BRONCHOSCOPE/LAVAGE: CPT

## 2024-05-02 PROCEDURE — 88112 CYTOPATH CELL ENHANCE TECH: CPT

## 2024-05-02 PROCEDURE — 88112 CYTOPATH CELL ENHANCE TECH: CPT | Mod: 26,59

## 2024-05-02 PROCEDURE — 87116 MYCOBACTERIA CULTURE: CPT

## 2024-05-02 PROCEDURE — 88312 SPECIAL STAINS GROUP 1: CPT | Mod: 26

## 2024-05-02 PROCEDURE — 87102 FUNGUS ISOLATION CULTURE: CPT

## 2024-05-02 PROCEDURE — 99222 1ST HOSP IP/OBS MODERATE 55: CPT | Mod: 25

## 2024-05-02 PROCEDURE — 31627 NAVIGATIONAL BRONCHOSCOPY: CPT

## 2024-05-02 PROCEDURE — 87206 SMEAR FLUORESCENT/ACID STAI: CPT

## 2024-05-02 PROCEDURE — 88173 CYTOPATH EVAL FNA REPORT: CPT

## 2024-05-02 PROCEDURE — 87070 CULTURE OTHR SPECIMN AEROBIC: CPT

## 2024-05-02 RX ORDER — OXYCODONE HYDROCHLORIDE 5 MG/1
5 TABLET ORAL ONCE
Refills: 0 | Status: DISCONTINUED | OUTPATIENT
Start: 2024-05-02 | End: 2024-05-02

## 2024-05-02 RX ORDER — SODIUM CHLORIDE 9 MG/ML
1000 INJECTION, SOLUTION INTRAVENOUS
Refills: 0 | Status: DISCONTINUED | OUTPATIENT
Start: 2024-05-02 | End: 2024-05-02

## 2024-05-02 RX ORDER — HYDROMORPHONE HYDROCHLORIDE 2 MG/ML
0.5 INJECTION INTRAMUSCULAR; INTRAVENOUS; SUBCUTANEOUS
Refills: 0 | Status: DISCONTINUED | OUTPATIENT
Start: 2024-05-02 | End: 2024-05-02

## 2024-05-02 RX ORDER — ACETAMINOPHEN 500 MG
650 TABLET ORAL ONCE
Refills: 0 | Status: DISCONTINUED | OUTPATIENT
Start: 2024-05-02 | End: 2024-05-02

## 2024-05-02 NOTE — BRIEF OPERATIVE NOTE - OPERATION/FINDINGS
right lower lobe lung nodule, robotic bronch, endobronchial brushing and BAL, prelim path nondiagnostic

## 2024-05-02 NOTE — BRIEF OPERATIVE NOTE - NSICDXBRIEFPROCEDURE_GEN_ALL_CORE_FT
PROCEDURES:  Navigational bronchoscopy 02-May-2024 16:36:55  Debbie Tirado  Robot-assisted bronchoscopic biopsy of lung using Ion endoluminal system 02-May-2024 16:37:30  Debbie Tirado

## 2024-05-02 NOTE — H&P ADULT - ATTENDING COMMENTS
I, Juvenal Hernandez saw, examined, and reviewed the diagnostic images on patient Kait Cortez on 05/02/24 and agreed with resident’s clinical note, physical examination, and treatment plan.     79-year-old female with a enlarging left upper lobe pulmonary nodule PET active suspicious for malignancy.  Patient is non-smoker and no family history of lung cancer.  CT scan and PET/CT images reviewed with the patient and concerns regarding malignancy were shared.  I recommended robotic assisted bronchoscopy fine-needle aspiration for diagnosis.  I described the procedure, risk and possible complications as well as expected recovery.  Patient understood and agreed to proceed.

## 2024-05-02 NOTE — H&P ADULT - HISTORY OF PRESENT ILLNESS
The patient is a 79 year old female who presents for elective robotic bronchoscopy with FNA by Dr. Ganesh Hernandez

## 2024-05-02 NOTE — ASU DISCHARGE PLAN (ADULT/PEDIATRIC) - PHYSICIAN SECTION COMPLETE
Telephone Encounter by Lolita Marshall RN at 03/27/17 09:03 AM     Author:  Lolita Marshall RN Service:  (none) Author Type:  Registered Nurse     Filed:  03/27/17 09:08 AM Encounter Date:  3/27/2017 Status:  Signed     :  Lolita Marshall RN (Registered Nurse)            .spoke with mom   Sibling was tested positive influenza A and was given Tamiflu and isn't taking     Patient has fever of 102.9 and cough  And started overnight  Mom gave her Tylenol this am    No difficulty breathing   No appetite and is drinking  Mom will call if her symptoms if worsen.[SD1.1M]          Revision History        User Key Date/Time User Provider Type Action    > SD1.1 03/27/17 09:08 AM Lolita Marshall RN Registered Nurse Sign    M - Manual             Yes

## 2024-05-02 NOTE — CHART NOTE - NSCHARTNOTEFT_GEN_A_CORE
PACU ANESTHESIA ADMISSION NOTE      Procedure: Navigational bronchoscopy    Robot-assisted bronchoscopic biopsy of lung using Ion endoluminal system      Post op diagnosis:  Lung nodule        ____  Intubated  TV:______       Rate: ______      FiO2: ______    __x__  Patent Airway    __x__  Full return of protective reflexes    __x__  Full recovery from anesthesia / back to baseline status    Vitals:  T(C): 36.7 (05-02-24 @ 14:51), Max: 36.7 (05-02-24 @ 13:32)  HR: 88 (05-02-24 @ 14:51) (88 - 88)  BP: 136/64 (05-02-24 @ 14:51) (136/64 - 136/64)  RR: 16 (05-02-24 @ 14:51) (16 - 16)  SpO2: 98% (05-02-24 @ 14:51) (98% - 98%)    Mental Status:  __x__ Awake   ___x__ Alert   _____ Drowsy   _____ Sedated    Nausea/Vomiting:  __x__ NO  ______Yes,   See Post - Op Orders          Pain Scale (0-10):  _____    Treatment: ____ None    __x__ See Post - Op/PCA Orders    Post - Operative Fluids:   ____ Oral   __x__ See Post - Op Orders    Plan: Discharge:   _x___Home       _____Floor     _____Critical Care    _____  Other:_________________    Comments: Patient had smooth intraoperative event, no anesthesia complication.

## 2024-05-02 NOTE — ASU DISCHARGE PLAN (ADULT/PEDIATRIC) - COMMENTS
Please call CT surgery office tomorrow morning to schedule post-procedural appointment day and time.

## 2024-05-02 NOTE — ASU DISCHARGE PLAN (ADULT/PEDIATRIC) - CARE PROVIDER_API CALL
Juvenal Rivera  Thoracic and Cardiac Surgery  [address] [place] [address], [address]  [place] [address]  Phone: [phone]  Fax: [phone]  Follow Up Time:

## 2024-05-03 LAB
GRAM STN FLD: SIGNIFICANT CHANGE UP
NIGHT BLUE STAIN TISS: SIGNIFICANT CHANGE UP
SPECIMEN SOURCE: SIGNIFICANT CHANGE UP
SPECIMEN SOURCE: SIGNIFICANT CHANGE UP

## 2024-05-04 LAB
CULTURE RESULTS: NO GROWTH — SIGNIFICANT CHANGE UP
SPECIMEN SOURCE: SIGNIFICANT CHANGE UP

## 2024-05-06 LAB
NON-GYNECOLOGICAL CYTOLOGY STUDY: SIGNIFICANT CHANGE UP

## 2024-05-08 DIAGNOSIS — R91.1 SOLITARY PULMONARY NODULE: ICD-10-CM

## 2024-05-08 DIAGNOSIS — Z86.73 PERSONAL HISTORY OF TRANSIENT ISCHEMIC ATTACK (TIA), AND CEREBRAL INFARCTION WITHOUT RESIDUAL DEFICITS: ICD-10-CM

## 2024-05-08 DIAGNOSIS — I10 ESSENTIAL (PRIMARY) HYPERTENSION: ICD-10-CM

## 2024-05-08 DIAGNOSIS — Z88.1 ALLERGY STATUS TO OTHER ANTIBIOTIC AGENTS STATUS: ICD-10-CM

## 2024-05-14 ENCOUNTER — RESULT REVIEW (OUTPATIENT)
Age: 79
End: 2024-05-14

## 2024-05-14 ENCOUNTER — OUTPATIENT (OUTPATIENT)
Dept: OUTPATIENT SERVICES | Facility: HOSPITAL | Age: 79
LOS: 1 days | End: 2024-05-14
Payer: MEDICARE

## 2024-05-14 ENCOUNTER — APPOINTMENT (OUTPATIENT)
Dept: CARDIOTHORACIC SURGERY | Facility: CLINIC | Age: 79
End: 2024-05-14
Payer: MEDICARE

## 2024-05-14 VITALS
BODY MASS INDEX: 23.75 KG/M2 | DIASTOLIC BLOOD PRESSURE: 83 MMHG | HEART RATE: 90 BPM | WEIGHT: 121 LBS | RESPIRATION RATE: 11 BRPM | OXYGEN SATURATION: 97 % | SYSTOLIC BLOOD PRESSURE: 157 MMHG | HEIGHT: 60 IN | TEMPERATURE: 97 F

## 2024-05-14 DIAGNOSIS — Z98.890 OTHER SPECIFIED POSTPROCEDURAL STATES: Chronic | ICD-10-CM

## 2024-05-14 DIAGNOSIS — R91.8 OTHER NONSPECIFIC ABNORMAL FINDING OF LUNG FIELD: ICD-10-CM

## 2024-05-14 PROCEDURE — 71046 X-RAY EXAM CHEST 2 VIEWS: CPT

## 2024-05-14 PROCEDURE — 99212 OFFICE O/P EST SF 10 MIN: CPT

## 2024-05-14 PROCEDURE — 71046 X-RAY EXAM CHEST 2 VIEWS: CPT | Mod: 26

## 2024-05-14 NOTE — PHYSICAL EXAM
[] : no respiratory distress [Respiration, Rhythm And Depth] : normal respiratory rhythm and effort [Exaggerated Use Of Accessory Muscles For Inspiration] : no accessory muscle use [Apical Impulse] : the apical impulse was normal [Heart Rate And Rhythm] : heart rate was normal and rhythm regular [Heart Sounds] : normal S1 and S2 [No Edema] : no edema

## 2024-05-15 DIAGNOSIS — R91.8 OTHER NONSPECIFIC ABNORMAL FINDING OF LUNG FIELD: ICD-10-CM

## 2024-05-29 LAB
CULTURE RESULTS: ABNORMAL
SPECIMEN SOURCE: SIGNIFICANT CHANGE UP

## 2024-06-07 NOTE — REASON FOR VISIT
[de-identified] : S/P Robotic Bronchoscopy [de-identified] : Here for postop F/U [de-identified] : 5/2/2024

## 2024-06-07 NOTE — ASSESSMENT
[FreeTextEntry1] :   Ms. PADMINI LOPEZ is a 79 year F, never smoker, that arrives today for a follow up for a 1.6 CM FDG Avid Lingular nodule. Also found to have FDG Avid Left Adrenal mass- no correlation on PET recommending short interval CT. Patient was seen by their Pulmonologist, Dr. Hardy for annual follow up exam. PMHX Bullous Lungs, HTN, HLD, Renal stones, current sinusitis on amoxicillin: Here today after bronchoscopy.  Plan: Reviewed CXR and Pathology with patient and  Non-Diagnostic  Bronchoscopy. normal lung? Offered once again surgical resection of this lesion for definitive diagnosis They prefer to wait for repeat imaging and then decide PET also showed uptake in left adrenal gland with no correlate requiring a CT Abdomen Will repeat a CT Chest and CT Abdomen in July 2024 F/U after for review F/U with PMD for routine medical care I, Juvenal Hernandez saw, examined and reviewed the diagnostic images on patient:  PADMINI LOPEZ on 05/14/2024 and agreed with my Nurse Practitioner's clinical note, physical exam findings and treatment plan. Patient presented for follow-up, she has a diagnosis of left upper lobe pulmonary nodule suspicious for malignancy.  I performed robotic bronchoscopy fine-needle aspiration.  Pathology report reviewed with the patient: Focal reactive changes and scattered mixed inflammatory cells.  Numerous histiocytes: Some lymphocytes.  I discussed the report in detail, there is always the possibility of false negative but the report is highly suggestive of inflammatory process.  I recommended short-term follow-up with CT scan of the chest within 8 weeks.  Patient understood and agreed with the plan.

## 2024-06-19 LAB
CULTURE RESULTS: SIGNIFICANT CHANGE UP
SPECIMEN SOURCE: SIGNIFICANT CHANGE UP

## 2024-07-19 ENCOUNTER — NON-APPOINTMENT (OUTPATIENT)
Age: 79
End: 2024-07-19

## 2024-08-21 ENCOUNTER — NON-APPOINTMENT (OUTPATIENT)
Age: 79
End: 2024-08-21

## 2024-10-11 ENCOUNTER — APPOINTMENT (OUTPATIENT)
Dept: PULMONOLOGY | Facility: CLINIC | Age: 79
End: 2024-10-11
Payer: MEDICARE

## 2024-10-11 VITALS
DIASTOLIC BLOOD PRESSURE: 68 MMHG | BODY MASS INDEX: 24.15 KG/M2 | WEIGHT: 123 LBS | HEART RATE: 84 BPM | HEIGHT: 60 IN | SYSTOLIC BLOOD PRESSURE: 130 MMHG | OXYGEN SATURATION: 97 %

## 2024-10-11 DIAGNOSIS — R09.82 POSTNASAL DRIP: ICD-10-CM

## 2024-10-11 DIAGNOSIS — R91.8 OTHER NONSPECIFIC ABNORMAL FINDING OF LUNG FIELD: ICD-10-CM

## 2024-10-11 DIAGNOSIS — R05.9 COUGH, UNSPECIFIED: ICD-10-CM

## 2024-10-11 PROCEDURE — 99213 OFFICE O/P EST LOW 20 MIN: CPT

## 2024-10-11 PROCEDURE — G2211 COMPLEX E/M VISIT ADD ON: CPT

## 2024-11-12 ENCOUNTER — OUTPATIENT (OUTPATIENT)
Dept: OUTPATIENT SERVICES | Facility: HOSPITAL | Age: 79
LOS: 1 days | End: 2024-11-12
Payer: MEDICARE

## 2024-11-12 ENCOUNTER — RESULT REVIEW (OUTPATIENT)
Age: 79
End: 2024-11-12

## 2024-11-12 DIAGNOSIS — R91.1 SOLITARY PULMONARY NODULE: ICD-10-CM

## 2024-11-12 DIAGNOSIS — N20.0 CALCULUS OF KIDNEY: Chronic | ICD-10-CM

## 2024-11-12 DIAGNOSIS — Z98.890 OTHER SPECIFIED POSTPROCEDURAL STATES: Chronic | ICD-10-CM

## 2024-11-12 DIAGNOSIS — Z00.8 ENCOUNTER FOR OTHER GENERAL EXAMINATION: ICD-10-CM

## 2024-11-12 PROCEDURE — 71250 CT THORAX DX C-: CPT | Mod: 26

## 2024-11-12 PROCEDURE — 71250 CT THORAX DX C-: CPT

## 2024-11-13 DIAGNOSIS — R91.1 SOLITARY PULMONARY NODULE: ICD-10-CM

## 2024-11-14 ENCOUNTER — NON-APPOINTMENT (OUTPATIENT)
Age: 79
End: 2024-11-14

## 2025-04-09 ENCOUNTER — APPOINTMENT (OUTPATIENT)
Dept: PULMONOLOGY | Facility: CLINIC | Age: 80
End: 2025-04-09
Payer: MEDICARE

## 2025-04-09 VITALS
OXYGEN SATURATION: 98 % | BODY MASS INDEX: 26.31 KG/M2 | SYSTOLIC BLOOD PRESSURE: 160 MMHG | RESPIRATION RATE: 14 BRPM | DIASTOLIC BLOOD PRESSURE: 90 MMHG | HEART RATE: 108 BPM | WEIGHT: 134 LBS | HEIGHT: 60 IN

## 2025-04-09 DIAGNOSIS — R91.8 OTHER NONSPECIFIC ABNORMAL FINDING OF LUNG FIELD: ICD-10-CM

## 2025-04-09 DIAGNOSIS — R09.82 POSTNASAL DRIP: ICD-10-CM

## 2025-04-09 PROCEDURE — G2211 COMPLEX E/M VISIT ADD ON: CPT

## 2025-04-09 PROCEDURE — 99213 OFFICE O/P EST LOW 20 MIN: CPT
